# Patient Record
Sex: MALE | Race: BLACK OR AFRICAN AMERICAN | URBAN - METROPOLITAN AREA
[De-identification: names, ages, dates, MRNs, and addresses within clinical notes are randomized per-mention and may not be internally consistent; named-entity substitution may affect disease eponyms.]

---

## 2018-06-08 ENCOUNTER — HOSPITAL ENCOUNTER (OUTPATIENT)
Dept: LAB | Age: 28
Discharge: HOME | End: 2018-06-08
Payer: COMMERCIAL

## 2018-06-08 ENCOUNTER — OFFICE VISIT (OUTPATIENT)
Dept: FAMILY MEDICINE CLINIC | Age: 28
End: 2018-06-08

## 2018-06-08 VITALS
TEMPERATURE: 95.1 F | HEART RATE: 58 BPM | RESPIRATION RATE: 18 BRPM | HEIGHT: 72 IN | DIASTOLIC BLOOD PRESSURE: 86 MMHG | OXYGEN SATURATION: 100 % | SYSTOLIC BLOOD PRESSURE: 131 MMHG | BODY MASS INDEX: 22.08 KG/M2 | WEIGHT: 163 LBS

## 2018-06-08 DIAGNOSIS — R10.32 LLQ ABDOMINAL PAIN: ICD-10-CM

## 2018-06-08 DIAGNOSIS — R10.32 LLQ ABDOMINAL PAIN: Primary | ICD-10-CM

## 2018-06-08 DIAGNOSIS — R35.0 URINARY FREQUENCY: ICD-10-CM

## 2018-06-08 LAB
BILIRUB UR QL STRIP: NEGATIVE
GLUCOSE UR-MCNC: NEGATIVE MG/DL
KETONES P FAST UR STRIP-MCNC: NEGATIVE MG/DL
PH UR STRIP: 7 [PH] (ref 4.6–8)
PROT UR QL STRIP: NEGATIVE
SP GR UR STRIP: 1.01 (ref 1–1.03)
UA UROBILINOGEN AMB POC: NORMAL (ref 0.2–1)
URINALYSIS CLARITY POC: CLEAR
URINALYSIS COLOR POC: YELLOW
URINE BLOOD POC: NEGATIVE
URINE LEUKOCYTES POC: NEGATIVE
URINE NITRITES POC: NEGATIVE

## 2018-06-08 PROCEDURE — 87086 URINE CULTURE/COLONY COUNT: CPT | Performed by: FAMILY MEDICINE

## 2018-06-08 NOTE — PATIENT INSTRUCTIONS
Reassurance, urine is fine. Follow up on lab results in 1-2 days. If you sign up for \"My Chart\" you will be able to see the results online, and if you have any questions you can send me an e-mail.

## 2018-06-08 NOTE — PROGRESS NOTES
Rm:1    Chief Complaint   Patient presents with    Urinary Frequency     denies any pain or odor    Side Pain     left side pain     Depression Screening:  PHQ over the last two weeks 6/8/2018   Little interest or pleasure in doing things Not at all   Feeling down, depressed or hopeless Not at all   Total Score PHQ 2 0       Learning Assessment:  Learning Assessment 6/8/2018   PRIMARY LEARNER Patient   HIGHEST LEVEL OF EDUCATION - PRIMARY LEARNER  SOME COLLEGE   PRIMARY LANGUAGE ENGLISH   LEARNER PREFERENCE PRIMARY DEMONSTRATION   ANSWERED BY patient   RELATIONSHIP SELF       Abuse Screening:  No flowsheet data found. Health Maintenance reviewed and discussed per provider: yes     Coordination of Care:    1. Have you been to the ER, urgent care clinic since your last visit? Hospitalized since your last visit? no    2. Have you seen or consulted any other health care providers outside of the [address] [place] [address] since your last visit? Include any pap smears or colon screening.  no

## 2018-06-08 NOTE — MR AVS SNAPSHOT
[address] [place] [address] [phone] 
[phone] Patient: Erin Vee MRN: [id number] OET:[birth year] Visit Information Date & Time Provider Department Dept. Phone Encounter #  
 6/8/2018  1:15 PM Moriah Moore, [address] [phone] [id number] Upcoming Health Maintenance Date Due DTaP/Tdap/Td series (1 - Tdap) 4/26/2011 Influenza Age 5 to Adult 8/1/2018 Allergies as of 6/8/2018  Review Complete On: 6/8/2018 By: Moriah Moore MD  
 No Known Allergies Current Immunizations  Never Reviewed No immunizations on file. Not reviewed this visit You Were Diagnosed With   
  
 Codes Comments LLQ abdominal pain    -  Primary ICD-10-CM: R10.32 
ICD-9-CM: 789.04 Urinary frequency     ICD-10-CM: R35.0 ICD-9-CM: 788.41 Vitals BP Pulse Temp Resp Height(growth percentile) Weight(growth percentile) 131/86 (BP 1 Location: Left arm, BP Patient Position: Sitting) (!) 58 95.1 °F (35.1 °C) (Oral) 18 6' (1.829 m) 163 lb (73.9 kg) SpO2 BMI Smoking Status 100% 22.11 kg/m2 Never Smoker Vitals History BMI and BSA Data Body Mass Index Body Surface Area  
 22.11 kg/m 2 1.94 m 2 Preferred Pharmacy Pharmacy Name Phone [id number], [address] [place] [address] [phone] Your Updated Medication List  
  
Notice  As of 6/8/2018  2:23 PM  
 You have not been prescribed any medications. We Performed the Following AMB POC URINALYSIS DIP STICK AUTO W/O MICRO [79305 CPT(R)] To-Do List   
 06/08/2018 Lab:  CBC WITH AUTOMATED DIFF   
  
 06/08/2018 Microbiology:  CULTURE, URINE   
  
 06/08/2018 Lab:  METABOLIC PANEL, COMPREHENSIVE Patient Instructions Reassurance, urine is fine. Follow up on lab results in 1-2 days.  If you sign up for \"My Chart\" you will be able to see the results online, and if you have any questions you can send me an e-mail. Introducing \[facility]\"" & HEALTH SERVICES! [facility] introduces [employer] patient portal. Now you can access parts of your medical record, email your doctor's office, and request medication refills online. 1. In your internet browser, go to [email or web address] 2. Click on the First Time User? Click Here link in the Sign In box. You will see the New Member Sign Up page. 3. Enter your PrivateCore Access Code exactly as it appears below. You will not need to use this code after youve completed the sign-up process. If you do not sign up before the expiration date, you must request a new code. · Private[employer] Access Code: [email or web address] Expires: 9/6/2018  2:23 PM 
 
4. Enter the last four digits of your Social Security Number (xxxx) and Date of Birth (mm/dd/yyyy) as indicated and click Submit. You will be taken to the next sign-up page. 5. Create a PrivateCore ID. This will be your PrivateCore login ID and cannot be changed, so think of one that is secure and easy to remember. 6. Create a PrivateCore password. You can change your password at any time. 7. Enter your Password Reset Question and Answer. This can be used at a later time if you forget your password. 8. Enter your e-mail address. You will receive e-mail notification when new information is available in [address] 9. Click Sign Up. You can now view and download portions of your medical record. 10. Click the Download Summary menu link to download a portable copy of your medical information. If you have questions, please visit the Frequently Asked Questions section of the Private[employer] website. Remember, PrivateCore is NOT to be used for urgent needs. For medical emergencies, dial 911. Now available from your iPhone and Android! Please provide this summary of care documentation to your next provider. If you have any questions after today's visit, please call [phone].

## 2018-06-08 NOTE — PROGRESS NOTES
HISTORY OF PRESENT ILLNESS  Genaro Dinero is a 29 y.o. male. HPI Comments: Avinash Cutler is here because of urinary frequency x 2 weeks, along with some L-sided abdominal pain. No fever, chills, nausea, vomiting. He has had recent cold sx and still has a lingering cough. He went to an urgent care center 2 weeks ago, they told him that his urine was fine but put him on antibiotics and referred him. The urologist's office called him and told him that they didn't need to see him, to find a PCP. He denies increased thirst.    He has been taking motrin and it helps. Urinary Frequency    Associated symptoms include frequency and abdominal pain. Pertinent negatives include no chills, no nausea, no vomiting, no hematuria, no urgency and no flank pain. Side Pain   Associated symptoms include abdominal pain. Pertinent negatives include no chest pain, no headaches and no shortness of breath. Review of Systems   Constitutional: Negative for chills and fever. Eyes: Negative for blurred vision and double vision. Respiratory: Negative for cough and shortness of breath. Cardiovascular: Negative for chest pain and palpitations. Gastrointestinal: Positive for abdominal pain. Negative for nausea and vomiting. Genitourinary: Positive for frequency. Negative for dysuria, flank pain, hematuria and urgency. Neurological: Negative for headaches. Physical Exam   Constitutional: He is oriented to person, place, and time. He appears well-developed and well-nourished. Eyes: Pupils are equal, round, and reactive to light. Neck: Neck supple. Cardiovascular: Normal rate and regular rhythm. Pulmonary/Chest: Effort normal and breath sounds normal. No respiratory distress. He has no wheezes. He has no rales. Abdominal: Soft. He exhibits no distension. There is no tenderness. There is no rebound and no guarding. Benign exam   Lymphadenopathy:     He has no cervical adenopathy.    Neurological: He is alert and oriented to person, place, and time. Nursing note and vitals reviewed. Results for orders placed or performed in visit on 06/08/18   AMB POC URINALYSIS DIP STICK AUTO W/O MICRO   Result Value Ref Range    Color (UA POC) Yellow     Clarity (UA POC) Clear     Glucose (UA POC) Negative Negative    Bilirubin (UA POC) Negative Negative    Ketones (UA POC) Negative Negative    Specific gravity (UA POC) 1.015 1.001 - 1.035    Blood (UA POC) Negative Negative    pH (UA POC) 7.0 4.6 - 8.0    Protein (UA POC) Negative Negative    Urobilinogen (UA POC) 0.2 mg/dL 0.2 - 1    Nitrites (UA POC) Negative Negative    Leukocyte esterase (UA POC) Negative Negative       ASSESSMENT and PLAN    ICD-10-CM ICD-9-CM    1. LLQ abdominal pain R10.32 789.04 AMB POC URINALYSIS DIP STICK AUTO W/O MICRO      CBC WITH AUTOMATED DIFF      METABOLIC PANEL, COMPREHENSIVE      CULTURE, URINE   2.  Urinary frequency R35.0 788.41 AMB POC URINALYSIS DIP STICK AUTO W/O MICRO      CBC WITH AUTOMATED DIFF      METABOLIC PANEL, COMPREHENSIVE      CULTURE, URINE       AVS instructions reviewed with patient, pt verbalized understanding

## 2018-06-10 LAB
BACTERIA SPEC CULT: NORMAL
SERVICE CMNT-IMP: NORMAL

## 2018-06-15 ENCOUNTER — LAB ONLY (OUTPATIENT)
Dept: FAMILY MEDICINE CLINIC | Age: 28
End: 2018-06-15

## 2018-06-15 ENCOUNTER — HOSPITAL ENCOUNTER (OUTPATIENT)
Dept: LAB | Age: 28
Discharge: HOME | End: 2018-06-15
Payer: COMMERCIAL

## 2018-06-15 DIAGNOSIS — R10.32 LLQ ABDOMINAL PAIN: ICD-10-CM

## 2018-06-15 DIAGNOSIS — R35.0 URINARY FREQUENCY: ICD-10-CM

## 2018-06-15 LAB
ALBUMIN SERPL-MCNC: 4.3 G/DL (ref 3.4–5)
ALBUMIN/GLOB SERPL: 1.5 {RATIO} (ref 0.8–1.7)
ALP SERPL-CCNC: 46 U/L (ref 45–117)
ALT SERPL-CCNC: 20 U/L (ref 16–61)
ANION GAP SERPL CALC-SCNC: 8 MMOL/L (ref 3–18)
AST SERPL-CCNC: 10 U/L (ref 15–37)
BASOPHILS # BLD: 0 K/UL (ref 0–0.06)
BASOPHILS NFR BLD: 1 % (ref 0–2)
BILIRUB SERPL-MCNC: 0.6 MG/DL (ref 0.2–1)
BUN SERPL-MCNC: 11 MG/DL (ref 7–18)
BUN/CREAT SERPL: 9 (ref 12–20)
CALCIUM SERPL-MCNC: 9.6 MG/DL (ref 8.5–10.1)
CHLORIDE SERPL-SCNC: 105 MMOL/L (ref 100–108)
CO2 SERPL-SCNC: 31 MMOL/L (ref 21–32)
CREAT SERPL-MCNC: 1.18 MG/DL (ref 0.6–1.3)
DIFFERENTIAL METHOD BLD: ABNORMAL
EOSINOPHIL # BLD: 0.2 K/UL (ref 0–0.4)
EOSINOPHIL NFR BLD: 5 % (ref 0–5)
ERYTHROCYTE [DISTWIDTH] IN BLOOD BY AUTOMATED COUNT: 12.9 % (ref 11.6–14.5)
GLOBULIN SER CALC-MCNC: 2.9 G/DL (ref 2–4)
GLUCOSE SERPL-MCNC: 88 MG/DL (ref 74–99)
HCT VFR BLD AUTO: 47.9 % (ref 36–48)
HGB BLD-MCNC: 15.3 G/DL (ref 13–16)
LYMPHOCYTES # BLD: 1.5 K/UL (ref 0.9–3.6)
LYMPHOCYTES NFR BLD: 34 % (ref 21–52)
MCH RBC QN AUTO: 29.7 PG (ref 24–34)
MCHC RBC AUTO-ENTMCNC: 31.9 G/DL (ref 31–37)
MCV RBC AUTO: 93 FL (ref 74–97)
MONOCYTES # BLD: 0.3 K/UL (ref 0.05–1.2)
MONOCYTES NFR BLD: 7 % (ref 3–10)
NEUTS SEG # BLD: 2.4 K/UL (ref 1.8–8)
NEUTS SEG NFR BLD: 53 % (ref 40–73)
PLATELET # BLD AUTO: 221 K/UL (ref 135–420)
PMV BLD AUTO: 12.3 FL (ref 9.2–11.8)
POTASSIUM SERPL-SCNC: 4.4 MMOL/L (ref 3.5–5.5)
PROT SERPL-MCNC: 7.2 G/DL (ref 6.4–8.2)
RBC # BLD AUTO: 5.15 M/UL (ref 4.7–5.5)
SODIUM SERPL-SCNC: 144 MMOL/L (ref 136–145)
WBC # BLD AUTO: 4.4 K/UL (ref 4.6–13.2)

## 2018-06-15 PROCEDURE — 80053 COMPREHEN METABOLIC PANEL: CPT | Performed by: FAMILY MEDICINE

## 2018-06-15 PROCEDURE — 85025 COMPLETE CBC W/AUTO DIFF WBC: CPT | Performed by: FAMILY MEDICINE

## 2018-06-15 NOTE — PROGRESS NOTES
Pt was seen in office on 6/8/15 during visit Dr. Domingo Epps ordered labs. Pt was not able to have labs drawn at that time, pt returned to office today to have labs drawn.

## 2018-06-19 ENCOUNTER — TELEPHONE (OUTPATIENT)
Dept: FAMILY MEDICINE CLINIC | Age: 28
End: 2018-06-19

## 2018-06-19 DIAGNOSIS — R35.0 URINARY FREQUENCY: Primary | ICD-10-CM

## 2018-06-20 NOTE — TELEPHONE ENCOUNTER
Pt calling for lab results. Doesn't appear they have been resulted yet, though they do appear in the system. Pt looking to receive results today if possible. Was seen 6/8/18. (Routing comment)     Please let pt know his labs look good. They should be available for his review on [employer].

## 2018-06-21 NOTE — TELEPHONE ENCOUNTER
Spoke with pt in regards to his lab results. Pt stated he is still having urinary frequency and is concerned why no one called him with suggestions. I informed the pt that his results were normal and \"no news was good news\". Pt said he believes he needs an abx because he felt better after taking 1 pill of his girlfriend's amoxicillin the other day. I informed the pt that an abx will most likely not be prescribed based on the urine results from his last visit and recommended he be seen to retest if he is still having symptoms. Pt stated he is currently out of town on vacation and is asking for something to be prescribed and sent to a pharmacy near him in [place] and is also requesting a referral to urology. Pt then asked if we did any STD testing during his visit on the 8th. I informed the pt that no STD testing was done if not asked for or indicated. Pt was upset because he thought STD testing was part of initial lab work and is experiencing urinary frequency and has not been prescribed any medication.      I finally informed the pt I would speak with the provider and recommended again that he be seen by an urgent care center

## 2018-06-21 NOTE — TELEPHONE ENCOUNTER
I have reviewed the patients labs again: urinalysis, urine culture, and blood work are all negative for infection. He should not be taking anyone else's antibiotics, of course, but one dose of amoxicillin is unlikely to have any noticeable effect. I have put in a referral to [facility] [place] for further workup and recommendations.